# Patient Record
Sex: FEMALE | Race: WHITE | NOT HISPANIC OR LATINO | Employment: FULL TIME | URBAN - METROPOLITAN AREA
[De-identification: names, ages, dates, MRNs, and addresses within clinical notes are randomized per-mention and may not be internally consistent; named-entity substitution may affect disease eponyms.]

---

## 2024-06-15 ENCOUNTER — OFFICE VISIT (OUTPATIENT)
Dept: URGENT CARE | Facility: URGENT CARE | Age: 39
End: 2024-06-15
Payer: COMMERCIAL

## 2024-06-15 VITALS
RESPIRATION RATE: 16 BRPM | TEMPERATURE: 98.3 F | SYSTOLIC BLOOD PRESSURE: 128 MMHG | DIASTOLIC BLOOD PRESSURE: 86 MMHG | WEIGHT: 124 LBS | HEART RATE: 82 BPM | OXYGEN SATURATION: 99 %
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
## 2024-06-15 DIAGNOSIS — S00.06XA INSECT BITE OF SCALP, INITIAL ENCOUNTER: Primary | ICD-10-CM

## 2024-06-15 PROCEDURE — 99203 OFFICE O/P NEW LOW 30 MIN: CPT | Performed by: FAMILY MEDICINE

## 2024-06-15 RX ORDER — SPIRONOLACTONE 100 MG/1
TABLET, FILM COATED ORAL
COMMUNITY
Start: 2024-03-22

## 2024-06-15 RX ORDER — BACLOFEN 10 MG/1
1 TABLET ORAL
COMMUNITY
Start: 2023-10-20

## 2024-06-15 RX ORDER — TRETINOIN 0.5 MG/G
CREAM TOPICAL
COMMUNITY
Start: 2024-03-22

## 2024-06-15 RX ORDER — CLINDAMYCIN PHOSPHATE 10 UG/ML
LOTION TOPICAL
COMMUNITY
Start: 2024-03-22

## 2024-06-15 RX ORDER — TOPIRAMATE 25 MG/1
TABLET, FILM COATED ORAL
COMMUNITY
Start: 2024-04-22

## 2024-06-15 RX ORDER — AMITRIPTYLINE HYDROCHLORIDE 50 MG/1
TABLET ORAL
COMMUNITY
Start: 2024-03-21

## 2024-06-15 ASSESSMENT — PAIN SCALES - GENERAL: PAINLEVEL: NO PAIN (0)

## 2024-06-15 NOTE — PROGRESS NOTES
Assessment & Plan     Insect bite of scalp, initial encounter  Apply topical hydrocortone cream tid for 3- 5 days, then as needed            Subjective   Elizabeth is a 38 year old, presenting for the following health issues:  Urgent Care and Insect Bite (Right neck-bug bite)  Patient reports she noticed small red spot, rash at her right upper shoulder this afternoon, [place]  There was no history of tick bite, she is not aware if she been bit by insect or any other mosquito.  The area is not itchy, is not painful, no bleeding      Review of Systems  Constitutional, HEENT, cardiovascular, pulmonary, gi and gu systems are negative, except as otherwise noted.      Objective    /86   Pulse 82   Temp 98.3  F (36.8  C) (Tympanic)   Resp 16   Wt 56.2 kg (124 lb)   SpO2 99%   There is no height or weight on file to calculate BMI.  Physical Exam   GENERAL: alert and no distress  SKIN: xerosis -  small area 2 mm by 1 mm at right shoulder .            Signed Electronically by: Steve Cary MD

## 2025-01-09 ENCOUNTER — OFFICE VISIT (OUTPATIENT)
Dept: URGENT CARE | Facility: URGENT CARE | Age: 40
End: 2025-01-09
Payer: COMMERCIAL

## 2025-01-09 VITALS
SYSTOLIC BLOOD PRESSURE: 116 MMHG | OXYGEN SATURATION: 97 % | HEART RATE: 98 BPM | TEMPERATURE: 97.9 F | WEIGHT: 120 LBS | DIASTOLIC BLOOD PRESSURE: 78 MMHG | RESPIRATION RATE: 18 BRPM

## 2025-01-09 DIAGNOSIS — J01.00 ACUTE NON-RECURRENT MAXILLARY SINUSITIS: Primary | ICD-10-CM

## 2025-01-09 RX ORDER — PREDNISONE 20 MG/1
40 TABLET ORAL DAILY
Qty: 10 TABLET | Refills: 0 | Status: SHIPPED | OUTPATIENT
Start: 2025-01-09 | End: 2025-01-14

## 2025-01-09 NOTE — PROGRESS NOTES
Assessment & Plan     Acute non-recurrent maxillary sinusitis  - amoxicillin-clavulanate (AUGMENTIN) 875-125 MG tablet  Dispense: 14 tablet; Refill: 0  - predniSONE (DELTASONE) 20 MG tablet  Dispense: 10 tablet; Refill: 0         No follow-ups on file.    FABIOLA Gracia CNP  [facility] [place]    Joel Johnson is a 39 year old female who presents to clinic today for the following health issues:  Chief Complaint   Patient presents with    Urgent Care     Sinus facial pain, runy stuffy nose, clogged ears and slight cough for 2+ weeks.        HPI          Review of Systems  Constitutional, HEENT, cardiovascular, pulmonary, GI, , musculoskeletal, neuro, skin, endocrine and psych systems are negative, except as otherwise noted.      Objective    /78   Pulse 98   Temp 97.9  F (36.6  C) (Tympanic)   Resp 18   Wt 54.4 kg (120 lb)   SpO2 97%   Physical Exam   GENERAL: alert and no distress  EYES: Eyes grossly normal to inspection, PERRL and conjunctivae and sclerae normal  HENT: normal cephalic/atraumatic, right ear: clear effusion and erythematous, left ear: erythematous, bulging membrane, and mucopurulent effusion, nasal mucosa edematous , rhinorrhea green, oral mucous membranes moist, and sinuses: maxillary tenderness on bilaterally, maxillary swelling on left  NECK: cervical adenopathy left anterior and no asymmetry, masses, or scars  MS: no gross musculoskeletal defects noted, no edema  SKIN: no suspicious lesions or rashes  NEURO: Normal strength and tone, mentation intact and speech normal  PSYCH: mentation appears normal, affect normal/bright

## 2025-01-09 NOTE — PATIENT INSTRUCTIONS
Treat for sinus infection with antibiotic Augmentin twice daily for 7 days.    If sinuses persist with inflammation or pressure, start prednisone.    Tiki Farrell, CNP

## 2025-01-11 ENCOUNTER — HEALTH MAINTENANCE LETTER (OUTPATIENT)
Age: 40
End: 2025-01-11

## 2025-01-18 ENCOUNTER — OFFICE VISIT (OUTPATIENT)
Dept: URGENT CARE | Facility: URGENT CARE | Age: 40
End: 2025-01-18
Payer: COMMERCIAL

## 2025-01-18 VITALS
SYSTOLIC BLOOD PRESSURE: 125 MMHG | OXYGEN SATURATION: 99 % | HEART RATE: 99 BPM | RESPIRATION RATE: 20 BRPM | TEMPERATURE: 98.1 F | WEIGHT: 120 LBS | DIASTOLIC BLOOD PRESSURE: 86 MMHG

## 2025-01-18 DIAGNOSIS — R09.81 SINUS CONGESTION: ICD-10-CM

## 2025-01-18 DIAGNOSIS — J01.90 ACUTE SINUSITIS WITH SYMPTOMS > 10 DAYS: Primary | ICD-10-CM

## 2025-01-18 PROCEDURE — 99213 OFFICE O/P EST LOW 20 MIN: CPT | Performed by: FAMILY MEDICINE

## 2025-01-18 RX ORDER — DOXYCYCLINE 100 MG/1
100 CAPSULE ORAL 2 TIMES DAILY
Qty: 20 CAPSULE | Refills: 0 | Status: SHIPPED | OUTPATIENT
Start: 2025-01-18 | End: 2025-01-28

## 2025-01-18 NOTE — PROGRESS NOTES
Assessment & Plan     Acute sinusitis with symptoms > 10 days  Differentials discussed in detail including subacute sinusitis.  Doxycycline prescribed.  Suggested well hydration, warm fluids, steam inhalation, humidifier use, Flonase and over-the-counter antihistamine.  ENT referral placed considering chronicity of symptoms.  Recommended to follow-up with PCP in 5 to 7 days or earlier if needed.  Patient, spouse understood and in agreement with above plan.  All questions answered.  - doxycycline hyclate (VIBRAMYCIN) 100 MG capsule; Take 1 capsule (100 mg) by mouth 2 times daily for 10 days.  - Adult ENT  Referral; Future    Sinus congestion  - Adult ENT  Referral; Future      Subjective   Elizabeth is a 39 year old, presenting for the following health issues:  Sinus Problem (Was seen on 1/9/25 for sinus infection, took all the medication and is still having facial pain and pressure)    HPI     Concern -   Onset: > 3 weeks  Description: sinus pressure, congestion, ear pressure, right-sided facial pain  Intensity: moderate  Progression of Symptoms:  same  Accompanying Signs & Symptoms: none  Previous history of similar problem:   Therapies tried and outcome: advil, Augmentin, prednisone      Review of Systems  Constitutional, HEENT, cardiovascular, pulmonary, gi and gu systems are negative, except as otherwise noted.      Objective    /86 (BP Location: Right arm, Patient Position: Sitting, Cuff Size: Adult Regular)   Pulse 99   Temp 98.1  F (36.7  C) (Tympanic)   Resp 20   Wt 54.4 kg (120 lb)   SpO2 99%   There is no height or weight on file to calculate BMI.  Physical Exam   GENERAL: alert and no distress  EYES: Eyes grossly normal to inspection, PERRL and conjunctivae and sclerae normal  HENT: normal cephalic/atraumatic, ear canals and TM's normal, nasal mucosa edematous , oral mucous membranes moist, and sinuses: maxillary tenderness on right  NECK: no adenopathy, no asymmetry, masses,  or scars  RESP: lungs clear to auscultation - no rales, rhonchi or wheezes  CV: regular rates and rhythm, normal S1 S2, no S3 or S4, and no murmur, click or rub  MS: no gross musculoskeletal defects noted, no edema  NEURO: Normal strength and tone, mentation intact and speech normal  PSYCH: mentation appears normal, affect normal/bright        Signed Electronically by: Javed Manzano MD

## 2025-01-26 NOTE — PROGRESS NOTES
I am seeing this patient in consultation for acute sinusitis at the request of the provider Javed Murphy      Chief Complaint - sinusitis    History of Present Illness - Elizabeth Lima is a 39 year old female who presents for evaluation of possible acute sinusitis. The patient describes symptoms of coming down with a cold around jhonathan, congestion, severe pressure that radiated to her ear, but no real discolored drainage for the past 1 month. Her hearing was down. Presently, the patient is symptomatic, but better. A few days ago she started to feel better. No pain or troubles chewing. The patient notes + allergies. Treatments have included antibiotics (augmentin followed by doxycycline), steroids, nasal steroids (flonase), nasal saline irrigations, and oral antihistamines. The treatments seem to be helping. No prior history of sinus surgery. She takes Elavil and topamax for chronic daily headaches. She had nausea this week.     Past Medical History -   - headache    Current Medications -   Current Outpatient Medications:     amitriptyline (ELAVIL) 50 MG tablet, , Disp: , Rfl:     baclofen (LIORESAL) 10 MG tablet, Take 1 tablet by mouth 2 times daily, Disp: , Rfl:     clindamycin (CLEOCIN T) 1 % external lotion, APPLY TOPICALLY TO FACE EVERY MORNING AS NEEDED, Disp: , Rfl:     doxycycline hyclate (VIBRAMYCIN) 100 MG capsule, Take 1 capsule (100 mg) by mouth 2 times daily for 10 days., Disp: 20 capsule, Rfl: 0    spironolactone (ALDACTONE) 100 MG tablet, , Disp: , Rfl:     topiramate (TOPAMAX) 25 MG tablet, , Disp: , Rfl:     tretinoin (RETIN-A) 0.05 % external cream, APPLY PEA SIZE AMOUNT ON ENTIRE FACE NIGHTLY AS TOLERATED, Disp: , Rfl:     Allergies -   Allergies   Allergen Reactions    Dust Mites Other (See Comments)     Sneezing,itchy eyes       Social History -   Social History     Socioeconomic History    Marital status:    Tobacco Use    Smoking status: Never    Smokeless tobacco: Never      Social Drivers of Health      Received from [facility]    Financial Resource Strain    Received from [facility]    Social Connections     Review of Systems - As per HPI and PMHx, otherwise 7 system review of the head and neck is negative.      Physical Exam  General - The patient is nontoxic, in no distress. Alert, answers questions and cooperates with examination appropriately.   Neurologic - CN II-XII are intact. No focal neurologic deficits.   Voice and Breathing - The patient was breathing comfortably without the use of accessory muscles. There was no wheezing, stridor, or stertor.  The patients voice was clear and strong.  Eyes - Extraocular movements intact.  Sclera were not icteric or injected, conjunctiva were pink and moist.  Mouth - Examination of the oral cavity showed pink, healthy oral mucosa. No lesions or ulcerations noted.  The tongue was mobile and midline.  Throat - The walls of the oropharynx were smooth, symmetric, and had no lesions or ulcerations.  No postnasal drainage.  The uvula was midline on elevation.  Ears - Bilateral pinna and EACs with normal appearing overlying skin. Tympanic membrane intact bilaterally. Bony landmarks of the ossicular chain are normal. No retraction, perforation, or masses.  No fluid or purulence was seen in the external canal or the middle ear.   Nose - External contour is symmetric, no gross deflection or scars. Nasal mucosa is pink and moist with no abnormal mucus.  The septum was midline and non-obstructive, turbinates of normal size and position.  No polyps, masses, or purulence noted on examination.  Neck - Soft, non-tender. Palpation of the occipital, submental, submandibular, internal jugular chain, and supraclavicular nodes did not demonstrate any abnormal lymph nodes or masses. No parotid masses. Palpation of the thyroid was soft and smooth, with no nodules or goiter appreciated.  The  trachea was mobile and midline.        A/P -     ICD-10-CM    1. Migraine without aura and without status migrainosus, not intractable  G43.009 topiramate (TOPAMAX) 25 MG tablet     methylPREDNISolone (MEDROL DOSEPAK) 4 MG tablet therapy pack      2. Acute sinusitis with symptoms > 10 days  J01.90 Adult ENT  Referral     methylPREDNISolone (MEDROL DOSEPAK) 4 MG tablet therapy pack      3. Sinus congestion  R09.81 Adult ENT  Referral          Elizabeth Lima is a 39 year old female with possible persistent acute/subacute sinusitis, or possibly resolving with persistent headache. She has a chronic daily headache history. Has had nausea due to the intense face pressure. Exam was mostly normal today. I recommend treatment with a medrol dose pack and taking topamax 25 mg BID (she only takes this once daily). She had augmentin and doxycycline, didn't help. No discolored drainage either. If this fails, she will call or mychart, and then I recommend a CT scan to determine if the patient is having sinusitis and to visualize the anatomy.     Kirk Zarate MD  Otolaryngology  [facility]

## 2025-01-28 ENCOUNTER — OFFICE VISIT (OUTPATIENT)
Dept: OTOLARYNGOLOGY | Facility: CLINIC | Age: 40
End: 2025-01-28
Payer: COMMERCIAL

## 2025-01-28 VITALS
HEART RATE: 82 BPM | OXYGEN SATURATION: 100 % | RESPIRATION RATE: 18 BRPM | DIASTOLIC BLOOD PRESSURE: 79 MMHG | SYSTOLIC BLOOD PRESSURE: 132 MMHG

## 2025-01-28 DIAGNOSIS — J01.90 ACUTE SINUSITIS WITH SYMPTOMS > 10 DAYS: ICD-10-CM

## 2025-01-28 DIAGNOSIS — G43.009 MIGRAINE WITHOUT AURA AND WITHOUT STATUS MIGRAINOSUS, NOT INTRACTABLE: Primary | ICD-10-CM

## 2025-01-28 DIAGNOSIS — R09.81 SINUS CONGESTION: ICD-10-CM

## 2025-01-28 PROCEDURE — 99203 OFFICE O/P NEW LOW 30 MIN: CPT | Performed by: OTOLARYNGOLOGY

## 2025-01-28 RX ORDER — METHYLPREDNISOLONE 4 MG/1
TABLET ORAL
Qty: 21 TABLET | Refills: 0 | Status: SHIPPED | OUTPATIENT
Start: 2025-01-28

## 2025-01-28 RX ORDER — TOPIRAMATE 25 MG/1
25 TABLET, FILM COATED ORAL 2 TIMES DAILY
Qty: 60 TABLET | Refills: 3 | Status: SHIPPED | OUTPATIENT
Start: 2025-01-28

## 2025-01-28 NOTE — LETTER
1/28/2025      Elizabeth Lima  [address] [facility] [address]      Dear Colleague,    Thank you for referring your patient, Elizabeth Lima, to the [facility]. Please see a copy of my visit note below.    I am seeing this patient in consultation for acute sinusitis at the request of the provider Javed Murphy      Chief Complaint - sinusitis    History of Present Illness - Elizabeth Lima is a 39 year old female who presents for evaluation of possible acute sinusitis. The patient describes symptoms of coming down with a cold around jhonathan, congestion, severe pressure that radiated to her ear, but no real discolored drainage for the past 1 month. Her hearing was down. Presently, the patient is symptomatic, but better. A few days ago she started to feel better. No pain or troubles chewing. The patient notes + allergies. Treatments have included antibiotics (augmentin followed by doxycycline), steroids, nasal steroids (flonase), nasal saline irrigations, and oral antihistamines. The treatments seem to be helping. No prior history of sinus surgery. She takes Elavil and topamax for chronic daily headaches. She had nausea this week.     Past Medical History -   - headache    Current Medications -   Current Outpatient Medications:      amitriptyline (ELAVIL) 50 MG tablet, , Disp: , Rfl:      baclofen (LIORESAL) 10 MG tablet, Take 1 tablet by mouth 2 times daily, Disp: , Rfl:      clindamycin (CLEOCIN T) 1 % external lotion, APPLY TOPICALLY TO FACE EVERY MORNING AS NEEDED, Disp: , Rfl:      doxycycline hyclate (VIBRAMYCIN) 100 MG capsule, Take 1 capsule (100 mg) by mouth 2 times daily for 10 days., Disp: 20 capsule, Rfl: 0     spironolactone (ALDACTONE) 100 MG tablet, , Disp: , Rfl:      topiramate (TOPAMAX) 25 MG tablet, , Disp: , Rfl:      tretinoin (RETIN-A) 0.05 % external cream, APPLY PEA SIZE AMOUNT ON ENTIRE FACE NIGHTLY AS TOLERATED, Disp: , Rfl:     Allergies -   Allergies   Allergen  Reactions     Dust Mites Other (See Comments)     Sneezing,itchy eyes       Social History -   Social History     Socioeconomic History     Marital status:    Tobacco Use     Smoking status: Never     Smokeless tobacco: Never     Social Drivers of Health      Received from [facility]    Financial Resource Strain    Received from [facility]    Social Connections     Review of Systems - As per HPI and PMHx, otherwise 7 system review of the head and neck is negative.      Physical Exam  General - The patient is nontoxic, in no distress. Alert, answers questions and cooperates with examination appropriately.   Neurologic - CN II-XII are intact. No focal neurologic deficits.   Voice and Breathing - The patient was breathing comfortably without the use of accessory muscles. There was no wheezing, stridor, or stertor.  The patients voice was clear and strong.  Eyes - Extraocular movements intact.  Sclera were not icteric or injected, conjunctiva were pink and moist.  Mouth - Examination of the oral cavity showed pink, healthy oral mucosa. No lesions or ulcerations noted.  The tongue was mobile and midline.  Throat - The walls of the oropharynx were smooth, symmetric, and had no lesions or ulcerations.  No postnasal drainage.  The uvula was midline on elevation.  Ears - Bilateral pinna and EACs with normal appearing overlying skin. Tympanic membrane intact bilaterally. Bony landmarks of the ossicular chain are normal. No retraction, perforation, or masses.  No fluid or purulence was seen in the external canal or the middle ear.   Nose - External contour is symmetric, no gross deflection or scars. Nasal mucosa is pink and moist with no abnormal mucus.  The septum was midline and non-obstructive, turbinates of normal size and position.  No polyps, masses, or purulence noted on examination.  Neck - Soft, non-tender. Palpation of the occipital,  submental, submandibular, internal jugular chain, and supraclavicular nodes did not demonstrate any abnormal lymph nodes or masses. No parotid masses. Palpation of the thyroid was soft and smooth, with no nodules or goiter appreciated.  The trachea was mobile and midline.        A/P -     ICD-10-CM    1. Migraine without aura and without status migrainosus, not intractable  G43.009 topiramate (TOPAMAX) 25 MG tablet     methylPREDNISolone (MEDROL DOSEPAK) 4 MG tablet therapy pack      2. Acute sinusitis with symptoms > 10 days  J01.90 Adult ENT  Referral     methylPREDNISolone (MEDROL DOSEPAK) 4 MG tablet therapy pack      3. Sinus congestion  R09.81 Adult ENT  Referral          Elizabeth Lima is a 39 year old female with possible persistent acute/subacute sinusitis, or possibly resolving with persistent headache. She has a chronic daily headache history. Has had nausea due to the intense face pressure. Exam was mostly normal today. I recommend treatment with a medrol dose pack and taking topamax 25 mg BID (she only takes this once daily). She had augmentin and doxycycline, didn't help. No discolored drainage either. If this fails, she will call or mychart, and then I recommend a CT scan to determine if the patient is having sinusitis and to visualize the anatomy.     Kirk Zarate MD  Otolaryngology  [facility]      Again, thank you for allowing me to participate in the care of your patient.        Sincerely,        Kirk Zarate MD    Electronically signed

## 2025-08-24 ENCOUNTER — HEALTH MAINTENANCE LETTER (OUTPATIENT)
Age: 40
End: 2025-08-24